# Patient Record
Sex: FEMALE | Race: OTHER | HISPANIC OR LATINO | URBAN - METROPOLITAN AREA
[De-identification: names, ages, dates, MRNs, and addresses within clinical notes are randomized per-mention and may not be internally consistent; named-entity substitution may affect disease eponyms.]

---

## 2023-12-20 ENCOUNTER — OFFICE VISIT (OUTPATIENT)
Dept: URGENT CARE | Facility: CLINIC | Age: 63
End: 2023-12-20
Payer: COMMERCIAL

## 2023-12-20 VITALS
SYSTOLIC BLOOD PRESSURE: 148 MMHG | RESPIRATION RATE: 16 BRPM | OXYGEN SATURATION: 96 % | TEMPERATURE: 98.5 F | HEART RATE: 109 BPM | DIASTOLIC BLOOD PRESSURE: 76 MMHG

## 2023-12-20 DIAGNOSIS — R68.89 FLU-LIKE SYMPTOMS: Primary | ICD-10-CM

## 2023-12-20 PROCEDURE — 87636 SARSCOV2 & INF A&B AMP PRB: CPT

## 2023-12-20 PROCEDURE — 99203 OFFICE O/P NEW LOW 30 MIN: CPT | Performed by: PHYSICIAN ASSISTANT

## 2023-12-20 RX ORDER — BENZONATATE 200 MG/1
200 CAPSULE ORAL 3 TIMES DAILY PRN
Qty: 20 CAPSULE | Refills: 0 | Status: SHIPPED | OUTPATIENT
Start: 2023-12-20 | End: 2023-12-27

## 2023-12-20 ASSESSMENT — PAIN SCALES - GENERAL: PAINLEVEL: 10-WORST PAIN EVER

## 2023-12-20 NOTE — PROGRESS NOTES
Subjective   Patient ID: Xochilt Swain is a 63 y.o. female who presents for Cough (Pt co of HA, BODY ACHES, COUGH).  Patient endorses fever yesterday but this is since resolved.  She denies any shortness of breath or chest pain denies nausea vomiting diarrhea or abdominal pain.  Symptoms started 3 days ago    No past medical history on file.      The remainder of the systems were reviewed and are negative unless noted above      Objective   /76   Pulse 109   Temp 36.9 °C (98.5 °F)   Resp 16   SpO2 96%   Physical Exam  Vitals reviewed.   Constitutional:       General: She is not in acute distress.     Appearance: Normal appearance. She is not toxic-appearing.   HENT:      Head: Normocephalic.      Right Ear: Tympanic membrane and ear canal normal. No tenderness. No mastoid tenderness.      Left Ear: Tympanic membrane and ear canal normal. No tenderness. No mastoid tenderness.      Nose: Congestion and rhinorrhea present.      Mouth/Throat:      Mouth: Mucous membranes are moist.      Pharynx: Oropharynx is clear. Posterior oropharyngeal erythema present.   Eyes:      Conjunctiva/sclera: Conjunctivae normal.      Pupils: Pupils are equal, round, and reactive to light.   Cardiovascular:      Rate and Rhythm: Normal rate and regular rhythm.      Heart sounds: No murmur heard.  Pulmonary:      Effort: No respiratory distress.      Breath sounds: No stridor. No wheezing, rhonchi or rales.   Chest:      Chest wall: No tenderness.   Abdominal:      Tenderness: There is no abdominal tenderness. There is no guarding.   Musculoskeletal:         General: Normal range of motion.   Skin:     General: Skin is warm and dry.      Capillary Refill: Capillary refill takes less than 2 seconds.      Findings: No erythema.   Neurological:      General: No focal deficit present.      Mental Status: She is alert.         Assessment/Plan   Problem List Items Addressed This Visit       Flu-like symptoms - Primary    Relevant  Medications    benzonatate (Tessalon) 200 mg capsule    Other Relevant Orders    Sars-CoV-2 and Influenza A/B PCR   -Patient with flulike illness for 3 days, likely viral respiratory infection.  No findings on physical exam to raise suspicion of pneumonia or focal bacterial infection  -Treating with benzonatate for the cough sending flu, COVID PCR testing to the lab  -Recommending rest increase fluids, use of over-the-counter cough and cold type remedies.  -Also recommending steam treatments, warm fluids such as tea and honey to help soothe the throat to clear the sinuses  -Follow-up with primary care in the next week if symptoms not improving

## 2023-12-20 NOTE — PATIENT INSTRUCTIONS
Assessment/Plan   Problem List Items Addressed This Visit       Flu-like symptoms - Primary    Relevant Medications    benzonatate (Tessalon) 200 mg capsule    Other Relevant Orders    Sars-CoV-2 and Influenza A/B PCR   -Patient with flulike illness for 3 days, likely viral respiratory infection.  No findings on physical exam to raise suspicion of pneumonia or focal bacterial infection  -Treating with benzonatate for the cough sending flu, COVID PCR testing to the lab  -Recommending rest increase fluids, use of over-the-counter cough and cold type remedies.  -Also recommending steam treatments, warm fluids such as tea and honey to help soothe the throat to clear the sinuses  -Follow-up with primary care in the next week if symptoms not improving

## 2023-12-21 LAB
FLUAV RNA RESP QL NAA+PROBE: NOT DETECTED
FLUBV RNA RESP QL NAA+PROBE: NOT DETECTED
SARS-COV-2 ORF1AB RESP QL NAA+PROBE: DETECTED